# Patient Record
Sex: FEMALE
[De-identification: names, ages, dates, MRNs, and addresses within clinical notes are randomized per-mention and may not be internally consistent; named-entity substitution may affect disease eponyms.]

---

## 2017-09-23 ENCOUNTER — HOSPITAL ENCOUNTER (EMERGENCY)
Dept: HOSPITAL 14 - H.ER | Age: 27
Discharge: HOME | End: 2017-09-23
Payer: COMMERCIAL

## 2017-09-23 VITALS
HEART RATE: 75 BPM | RESPIRATION RATE: 16 BRPM | SYSTOLIC BLOOD PRESSURE: 145 MMHG | TEMPERATURE: 97 F | OXYGEN SATURATION: 99 % | DIASTOLIC BLOOD PRESSURE: 98 MMHG

## 2017-09-23 DIAGNOSIS — R20.2: Primary | ICD-10-CM

## 2017-09-23 DIAGNOSIS — R42: ICD-10-CM

## 2017-09-23 LAB
BASOPHILS # BLD AUTO: 0 K/UL (ref 0–0.2)
BASOPHILS NFR BLD: 0.3 % (ref 0–2)
BUN SERPL-MCNC: 8 MG/DL (ref 7–17)
CALCIUM SERPL-MCNC: 9.5 MG/DL (ref 8.4–10.2)
CHLORIDE SERPL-SCNC: 106 MMOL/L (ref 98–107)
CO2 SERPL-SCNC: 23 MMOL/L (ref 22–30)
EOSINOPHIL # BLD AUTO: 0 K/UL (ref 0–0.7)
EOSINOPHIL NFR BLD: 0.2 % (ref 0–4)
ERYTHROCYTE [DISTWIDTH] IN BLOOD BY AUTOMATED COUNT: 14.5 % (ref 11.5–14.5)
GLUCOSE SERPL-MCNC: 99 MG/DL (ref 65–105)
HCT VFR BLD CALC: 36.7 % (ref 34–47)
LYMPHOCYTES # BLD AUTO: 1.1 K/UL (ref 1–4.3)
LYMPHOCYTES NFR BLD AUTO: 19.8 % (ref 20–40)
MCH RBC QN AUTO: 27.9 PG (ref 27–31)
MCHC RBC AUTO-ENTMCNC: 32.6 G/DL (ref 33–37)
MCV RBC AUTO: 85.6 FL (ref 81–99)
MONOCYTES # BLD: 0.2 K/UL (ref 0–0.8)
MONOCYTES NFR BLD: 4 % (ref 0–10)
NEUTROPHILS # BLD: 4.1 K/UL (ref 1.8–7)
NEUTROPHILS NFR BLD AUTO: 75.7 % (ref 50–75)
NRBC BLD AUTO-RTO: 0.2 % (ref 0–0)
PLATELET # BLD: 222 K/UL (ref 130–400)
PMV BLD AUTO: 9.4 FL (ref 7.2–11.7)
POTASSIUM SERPL-SCNC: 4 MMOL/L (ref 3.6–5)
SODIUM SERPL-SCNC: 143 MMOL/L (ref 132–148)
WBC # BLD AUTO: 5.5 K/UL (ref 4.8–10.8)

## 2017-09-23 PROCEDURE — 82948 REAGENT STRIP/BLOOD GLUCOSE: CPT

## 2017-09-23 PROCEDURE — 93005 ELECTROCARDIOGRAM TRACING: CPT

## 2017-09-23 PROCEDURE — 96360 HYDRATION IV INFUSION INIT: CPT

## 2017-09-23 PROCEDURE — 99285 EMERGENCY DEPT VISIT HI MDM: CPT

## 2017-09-23 PROCEDURE — 80048 BASIC METABOLIC PNL TOTAL CA: CPT

## 2017-09-23 PROCEDURE — 85025 COMPLETE CBC W/AUTO DIFF WBC: CPT

## 2017-09-23 PROCEDURE — 81025 URINE PREGNANCY TEST: CPT

## 2017-09-23 NOTE — ED PDOC
- Laboratory Results


Result Diagrams: 


 09/23/17 14:30





 09/23/17 14:30





- ECG


O2 Sat by Pulse Oximetry: 99 (RA)


Pulse Ox Interpretation: Normal





Medical Decision Making


Medical Decision Making: 





Receiving sign out:


patient signed out to me by Dr. Henriquez, pending Labs and reassessment





Scribe Attestation:


Documented by Carlito Vickers, acting as a scribe for Romelia Nicole MD





Provider Scribe Attestation:


All medical record entries made by the Scribe were at my direction and 

personally dictated by me. I have reviewed the chart and agree that the record 

accurately reflects my personal performance of the history, physical exam, 

medical decision making, and the department course for this patient. I have 

also personally directed, reviewed, and agree with the discharge instructions 

and disposition.





Disposition





- Clinical Impression


Clinical Impression: 


 Dizziness, Paresthesia








- POA


Present On Arrival: None





- Disposition


Referrals: 


[facility] [Outside] (FOLLOW UP IN 1-2 WEEKS)


Disposition: Routine/Home


Disposition Time: 15:00


Condition: GOOD


Instructions:  Lightheadedness (ED)


Forms:  [facility] ED School/Work Excuse





Progress Note





- Review of Symptoms


Events since last encounter: 





1502


Labs reviewed show no clinically significant abnormalities


DW pt findings and all concerns and questions addressed. Advised f/u pmd.

## 2017-09-23 NOTE — ED PDOC
Syncope/Near Syncope/Dizziness


Time Seen by Provider: 17 13:16


Chief Complaint (Nursing): Dizziness/Lightheaded


Chief Complaint (Provider): Dizzy Spell


History Per: Patient


History/Exam Limitations: no limitations


Onset/Duration Of Symptoms: Hrs


Current Symptoms Are (Timing): Still Present


Additional Complaint(s): 


Kayla Peguero a 27 year old female, with a past medical history of iron 

deficient anemia and chronic anxiety presents to the ED, after she experienced 

a dizzy spell. The patient reports that the spell only lasted a few seconds but 

decided to come to the ED to get evaluated. she states after the dizzy spell 

passed she experienced a rush in her head. In addition, the patient states that 

for the past few weeks she has been experiencing painful pinching sensations in 

her breast. She reports that her symptoms have gotten worse but she still has 

not seen her PMD.





Of note: Patient states she is currently not taking any medications for her 

anxiety.





PMD: [facility]





Past Medical History


Reviewed: Historical Data, Nursing Documentation, Vital Signs


Vital Signs: 





 Last Vital Signs











Temp  97.0 F L  17 13:05


 


Pulse  75   17 13:05


 


Resp  16   17 13:05


 


BP  145/98 H  17 13:05


 


Pulse Ox  99   17 13:05














- Medical History


PMH: Anemia, Anxiety





- Surgical History


Surgical History:  (x1)





- Family History


Family History: States: Unknown Family Hx





- Social History


Current smoker - smoking cessation education provided: No


Ex-Smoker (has not smoked in the last 12 months): No


Alcohol: Occasional


Drugs: Denies





- Home Medications


Home Medications: 


 Ambulatory Orders











 Medication  Instructions  Recorded


 


Famotidine [Pepcid] 20 mg PO BID #20 tab 17


 


Ondansetron ODT [Zofran ODT] 4 mg PO Q8H PRN #20 odt 17














- Allergies


Allergies/Adverse Reactions: 


 Allergies











Allergy/AdvReac Type Severity Reaction Status Date / Time


 


Penicillins Allergy  URTICARIA Verified 17 14:57














Review of Systems


ROS Statement: Except As Marked, All Systems Reviewed And Found Negative


Constitutional: Positive for: Other (pinching sensation in breasts)


Neurological: Positive for: Dizziness (dizzy spell x1)





Physical Exam





- Reviewed


Nursing Documentation Reviewed: Yes


Vital Signs Reviewed: Yes





- Physical Exam


Appears: Positive for: Non-toxic, No Acute Distress


Head Exam: Positive for: ATRAUMATIC, NORMAL INSPECTION, NORMOCEPHALIC


Skin: Positive for: Normal Color, Warm, Dry.  Negative for: Rash


Eye Exam: Positive for: Normal appearance, EOMI, PERRL.  Negative for: Nystagmus


Neck: Positive for: Normal, Painless ROM, Supple


Cardiovascular/Chest: Positive for: Regular Rate, Rhythm, Chest Non Tender.  

Negative for: Tachycardia


Respiratory: Positive for: Normal Breath Sounds.  Negative for: Rhonchi, 

Wheezing, Respiratory Distress


Back: Positive for: Normal Inspection.  Negative for: L CVA Tenderness, R CVA 

Tenderness


Extremity: Positive for: Normal ROM.  Negative for: Tenderness, Deformity, 

Swelling


Neurologic/Psych: Positive for: Alert, Oriented





- Laboratory Results


Result Diagrams: 


 17 14:30





 17 14:30





- ECG


O2 Sat by Pulse Oximetry: 99 (RA)


Pulse Ox Interpretation: Normal





Medical Decision Making


Medical Decision Makin


Initial Impression: 27 year old female presenting with dizzy spell





Initial Plan:


* EKG 


* BMP 


* Udip 


* Upreg 


* CBC 


* NS 1000mls IV 1000mls/hr 


* Reevaluation





_____________________________________________________________-


Scribe Attestation


Documented by Sabina Sagastume acting as a scribe for Radha Henriquez MD.





Provider Attestation:


All medical record entries made by the Scribe were at my direction and 

personally dictated by me. I have reviewed the chart and agree that the record 

accurately reflects my personal performance of the history, physical exam, 

medical decision making, and the department course for this patient. I have 

also personally directed, reviewed, and agree with the discharge instructions 

and disposition.





Disposition





- Clinical Impression


Clinical Impression: 


 Dizziness, Paresthesia








- Patient ED Disposition


Is Patient to be Admitted: Transfer of Care





- Disposition


Referrals: 


[facility] [Outside] (FOLLOW UP IN 1-2 WEEKS)


Disposition: Transfer of Care


Disposition Time: 15:00


Condition: GOOD


Instructions:  Lightheadedness (ED)


Forms:  [facility] ED School/Work Excuse


Patient Signed Over To: Romelia Nicole


Present On Arrival: None

## 2017-09-24 NOTE — CARD
--------------- APPROVED REPORT --------------





EKG Measurement

Heart [id number]

IN [id number]

[id number]

[id number]

[id number]



<Conclusion>

Normal sinus rhythm

Normal ECG

## 2018-03-27 ENCOUNTER — HOSPITAL ENCOUNTER (EMERGENCY)
Dept: HOSPITAL 14 - H.ER | Age: 28
Discharge: HOME | End: 2018-03-27
Payer: SELF-PAY

## 2018-03-27 VITALS
RESPIRATION RATE: 18 BRPM | TEMPERATURE: 98.5 F | HEART RATE: 78 BPM | SYSTOLIC BLOOD PRESSURE: 132 MMHG | DIASTOLIC BLOOD PRESSURE: 78 MMHG

## 2018-03-27 VITALS — OXYGEN SATURATION: 100 %

## 2018-03-27 DIAGNOSIS — F41.9: ICD-10-CM

## 2018-03-27 DIAGNOSIS — K59.00: Primary | ICD-10-CM

## 2018-03-27 DIAGNOSIS — Z88.0: ICD-10-CM

## 2018-03-27 LAB
ALBUMIN SERPL-MCNC: 4.5 G/DL (ref 3.5–5)
ALBUMIN/GLOB SERPL: 1.2 {RATIO} (ref 1–2.1)
ALT SERPL-CCNC: 33 U/L (ref 9–52)
AST SERPL-CCNC: 26 U/L (ref 14–36)
BASOPHILS # BLD AUTO: 0 K/UL (ref 0–0.2)
BASOPHILS NFR BLD: 0.3 % (ref 0–2)
BUN SERPL-MCNC: 10 MG/DL (ref 7–17)
CALCIUM SERPL-MCNC: 9.5 MG/DL (ref 8.4–10.2)
EOSINOPHIL # BLD AUTO: 0.1 K/UL (ref 0–0.7)
EOSINOPHIL NFR BLD: 1.3 % (ref 0–4)
ERYTHROCYTE [DISTWIDTH] IN BLOOD BY AUTOMATED COUNT: 14.5 % (ref 11.5–14.5)
GFR NON-AFRICAN AMERICAN: > 60
HGB BLD-MCNC: 12.2 G/DL (ref 12–16)
LIPASE SERPL-CCNC: 97 U/L (ref 23–300)
LYMPHOCYTES # BLD AUTO: 3.4 K/UL (ref 1–4.3)
LYMPHOCYTES NFR BLD AUTO: 37.6 % (ref 20–40)
MCH RBC QN AUTO: 28 PG (ref 27–31)
MCHC RBC AUTO-ENTMCNC: 33.5 G/DL (ref 33–37)
MCV RBC AUTO: 83.6 FL (ref 81–99)
MONOCYTES # BLD: 0.5 K/UL (ref 0–0.8)
MONOCYTES NFR BLD: 5.7 % (ref 0–10)
NEUTROPHILS # BLD: 4.9 K/UL (ref 1.8–7)
NEUTROPHILS NFR BLD AUTO: 55.1 % (ref 50–75)
NRBC BLD AUTO-RTO: 0.1 % (ref 0–0)
PLATELET # BLD: 263 K/UL (ref 130–400)
PMV BLD AUTO: 9.1 FL (ref 7.2–11.7)
RBC # BLD AUTO: 4.35 MIL/UL (ref 3.8–5.2)
WBC # BLD AUTO: 9 K/UL (ref 4.8–10.8)

## 2018-03-27 NOTE — ED PDOC
HPI: Abdomen


Time Seen by Provider: 18 21:47


Chief Complaint (Nursing): Abdominal Pain


Chief Complaint (Provider): abdominal bloating


History Per: Patient


History/Exam Limitations: no limitations


Onset/Duration Of Symptoms: Days (1 month)


Current Symptoms Are (Timing): Still Present


Additional Complaint(s): 





28 y/o female presents for evaluation of abdominal bloating x 1 month.  Patient 

states every time she eats she feels like her stomach is "going to pop".  

Patient also reports not having a menstrual period since February; states 

periods usually regular.  Patient states she was evaluated at [facility] less 

than one month ago for same and had a normal pelvic ultrasound.  Denies fever, 

nausea/vomiting, chest pain, shortness of breath, palpitations, changes in 

bowel movements, urinary symptoms.  





Past Medical History


Reviewed: Historical Data, Nursing Documentation, Vital Signs


Vital Signs: 


 Last Vital Signs











Temp  98.3 F   18 21:34


 


Pulse  76   18 21:34


 


Resp  16   18 21:34


 


BP      


 


Pulse Ox  100   18 21:59














- Medical History


PMH: Anemia, Anxiety





- Surgical History


Surgical History:  (x1)





- Family History


Family History: States: Unknown Family Hx





- Home Medications


Home Medications: 


 Ambulatory Orders











 Medication  Instructions  Recorded


 


Famotidine [Pepcid] 20 mg PO BID #20 tab 17


 


Ondansetron ODT [Zofran ODT] 4 mg PO Q8H PRN #20 odt 17


 


Polyethylene Glycol 3350 [Miralax] 17 gm PO DAILY PRN #5 powd.pack 18


 


Simethicone 125 mg PO QID PRN #50 capsule 18














- Allergies


Allergies/Adverse Reactions: 


 Allergies











Allergy/AdvReac Type Severity Reaction Status Date / Time


 


Penicillins Allergy  URTICARIA Verified 18 21:34














Review of Systems


ROS Statement: Except As Marked, All Systems Reviewed And Found Negative


Gastrointestinal: Positive for: Other (bloating)





Physical Exam





- Reviewed


Nursing Documentation Reviewed: Yes


Vital Signs Reviewed: Yes





- Physical Exam


Appears: Positive for: Well, Non-toxic, No Acute Distress


Head Exam: Positive for: ATRAUMATIC, NORMAL INSPECTION, NORMOCEPHALIC


Skin: Positive for: Normal Color


Eye Exam: Positive for: Normal appearance


ENT: Positive for: Normal ENT Inspection


Cardiovascular/Chest: Positive for: Regular Rate, Rhythm


Respiratory: Positive for: Normal Breath Sounds


Gastrointestinal/Abdominal: Positive for: Normal Exam, Bowel Sounds, Soft.  

Negative for: Tenderness


Back: Positive for: Normal Inspection


Extremity: Positive for: Normal ROM


Neurologic/Psych: Positive for: Alert, Oriented





- Laboratory Results


Result Diagrams: 


 18 22:05





 18 22:05





- ECG


O2 Sat by Pulse Oximetry: 100





- Other Rad


  ** obstructive series


X-Ray: Viewed By Me


X-Ray Interpretation: +moderate stool/gas; no air-fluid levels





- Progress


ED Course And Treament: 





labs, urine, xray





Patient educated on findings, discharged with rx Miralax, simethicone


Follow up PMD 2-3 days.


Return precautions given.





Disposition





- Clinical Impression


Clinical Impression: 


 Abdominal bloating, Constipation








- Patient ED Disposition


Is Patient to be Admitted: No


Counseled Patient/Family Regarding: Studies Performed, Diagnosis, Need For 

Followup, Rx Given





- Disposition


Referrals: 


[facility] [Outside]


Disposition: Routine/Home


Disposition Time: 23:46


Condition: IMPROVED


Prescriptions: 


Polyethylene Glycol 3350 [Miralax] 17 gm PO DAILY PRN #5 powd.pack


 PRN Reason: Constipation


Simethicone 125 mg PO QID PRN #50 capsule


 PRN Reason: gas


Instructions:  Gas and Bloating, Constipation in Adults


Forms:  [facility] (English), [facility] ED School/Work Excuse

## 2018-03-28 NOTE — RAD
PROCEDURE:  Radiographs of the chest and abdomen (obstructive series)



HISTORY:

abd pain  



COMPARISON:

No prior.



TECHNIQUE:

AP radiograph of the chest, with upright and supine radiographs of 

the abdomen.



FINDINGS:



CHEST:

Lungs: Clear.



Cardiovascular: Normal size heart. No pulmonary vascular congestion.



Pleura: No pleural fluid. No pneumothorax.



Other findings: None.



ABDOMEN AND PELVIS:

Bowel: Unremarkable bowel gas pattern. No evidence of mechanical 

obstruction.



Free air: None.



Bones:  Unremarkable.



Other findings: None.



IMPRESSION:

Unremarkable radiographs of chest and abdomen. No evidence of 

mechanical bowel obstruction.